# Patient Record
Sex: MALE | Race: WHITE | URBAN - METROPOLITAN AREA
[De-identification: names, ages, dates, MRNs, and addresses within clinical notes are randomized per-mention and may not be internally consistent; named-entity substitution may affect disease eponyms.]

---

## 2017-11-29 ENCOUNTER — OFFICE VISIT (OUTPATIENT)
Dept: FAMILY MEDICINE | Facility: CLINIC | Age: 47
End: 2017-11-29
Payer: COMMERCIAL

## 2017-11-29 VITALS
RESPIRATION RATE: 12 BRPM | BODY MASS INDEX: 19.88 KG/M2 | DIASTOLIC BLOOD PRESSURE: 79 MMHG | HEIGHT: 71 IN | HEART RATE: 62 BPM | SYSTOLIC BLOOD PRESSURE: 123 MMHG | OXYGEN SATURATION: 97 % | WEIGHT: 142 LBS | TEMPERATURE: 99.6 F

## 2017-11-29 DIAGNOSIS — Z71.84 TRAVEL ADVICE ENCOUNTER: Primary | ICD-10-CM

## 2017-11-29 PROCEDURE — 99401 PREV MED CNSL INDIV APPRX 15: CPT | Mod: 25 | Performed by: FAMILY MEDICINE

## 2017-11-29 PROCEDURE — 90471 IMMUNIZATION ADMIN: CPT | Performed by: FAMILY MEDICINE

## 2017-11-29 PROCEDURE — 90691 TYPHOID VACCINE IM: CPT | Performed by: FAMILY MEDICINE

## 2017-11-29 RX ORDER — ATOVAQUONE AND PROGUANIL HYDROCHLORIDE 250; 100 MG/1; MG/1
1 TABLET, FILM COATED ORAL DAILY
Qty: 23 TABLET | Refills: 0 | Status: SHIPPED | OUTPATIENT
Start: 2017-11-29

## 2017-11-29 RX ORDER — AZITHROMYCIN 500 MG/1
1000 TABLET, FILM COATED ORAL ONCE
Qty: 2 TABLET | Refills: 0 | Status: SHIPPED | OUTPATIENT
Start: 2017-11-29 | End: 2017-11-29

## 2017-11-29 RX ORDER — ACETAZOLAMIDE 125 MG/1
TABLET ORAL
Qty: 3 TABLET | Refills: 0 | Status: SHIPPED | OUTPATIENT
Start: 2017-11-29

## 2017-11-29 NOTE — PROGRESS NOTES
SUBJECTIVE: Hugh Birmingham , a 47 year old  male, presents for counseling and information regarding upcoming travel to [place]. Special medical concerns include: None. He anticipates the following unusual exposures: Hiking in rainforest.    Itinerary:  [place]    Departure Date: 12/24/17 Return date: 1/6/18    Reason for travel (i.e. Business, pleasure): pleasure    Visiting an urban or rural area?: both    Accommodations (i.e. hotel, hostel, friends, family, etc): hotel    Women - First day of your last period: n/a    IMMUNIZATION HISTORY  Have you received any vaccinations in the past 4 weeks?  No  Have you ever fainted from having your blood drawn or from an injection?  Yes  Have you ever had a fever reaction to vaccination?  No  Have you ever had any bad reaction or side effect from any vaccination?  No  Have you ever had hepatitis A or B vaccine?  Yes  Do you live (or work closely) with anyone who has AIDS, an AIDS-like condition, any other immune disorder or who is on chemotherapy for cancer?  No  Have you received any injection of immune globulin or any blood products during the past 12 months?  No    GENERAL MEDICAL HISTORY  Do you have a medical condition that warrants maintenance medication or physician follow-up?  No  Do you have a medical condition that is stable now, but that may recur while traveling?  No  Has your spleen been removed?  No  Have you had an acute illness or a fever in the past 48 hours?  No  Are you pregnant, or might you become pregnant on this trip?  Any chance of pregnancy?  No  Are you breastfeeding?  No  Do you have HIV, AIDS, an AIDS-like condition, any other immune disorder, leukemia or cancer?  No  Do you have a severe combined immunodeficiency disease?  No  Have you had your thymus gland removed or history of problems with your thymus, such as myasthenia gravis, DiGeorge syndrome, or thymoma?  No    Do you have severe thrombocytopenia (low platelet count) or a coagulation  disorder?  No  Have you ever had a convulsion, seizure, epilepsy, neurologic condition or brain infection?  No  Do you have any stomach conditions?  No  Do you have a G6PD deficiency?  No  Do you have severe renal or kidney impairment?  No  Do you have a history of psychiatric problems?  No  Do you have a problem with strange dreams and/or nightmares?  No  Do you have insomnia?  No  Do you have problems with vaginitis?  No  Do you have psoriasis?  No  Are you prone to motion sickness?  No  Have you ever had headaches, nausea, vomiting, or breathing problems from altitude exposure?  Yes      No past medical history on file.     There is no immunization history on file for this patient.    No current outpatient prescriptions on file.     Allergies not on file     EXAM: deferred    Immunizations discussed include: Hepatitis A, Hepatitis B, Typhoid, Rabies, Yellow Fever, Tetanus/Diphtheria and Measles/Mumps/Rubella  Malaraia prophylaxis recommended: Malarone  Symptomatic treatment for traveler's diarrhea: Azithromycin     ASSESSMENT/PLAN:  1. Travel advice encounter  - Yellow Fever vaccine UTD  - Hep A/B, MMR, polio, flu UTD  - atovaquone-proguanil (MALARONE) 250-100 MG per tablet; Take 1 tablet by mouth daily Start 2 days before travel and continue 7 days after return.  Dispense: 23 tablet; Refill: 0  - azithromycin (ZITHROMAX) 500 MG tablet; Take 2 tablets (1,000 mg) by mouth once for 1 dose As needed for traveler's diarrhea  Dispense: 2 tablet; Refill: 0  - acetaZOLAMIDE (DIAMOX) 125 MG tablet; To prevent altitude sickness.  Take one tablet every 12 hours, starting one day prior to ascent.  Continue for 24-48 hours while at the higher elevation.  Dispense: 3 tablet; Refill: 0  - TYPHOID VACCINE, IM    I have reviewed general recommendations for safe travel   including: food/water precautions, insect avoidance, safe sex   practices given high prevalence of HIV and other STDs,   roadway safety. Educational materials  and links to the [facility]   Traveler's health website have been provided.    Total time 15 minutes, greater than 50 percent in counseling   and coordination of care.

## 2017-11-29 NOTE — MR AVS SNAPSHOT
"              After Visit Summary   2017    Hugh Birmingham    MRN: [id number]           Patient Information     Date Of Birth          1970        Visit Information        Provider Department      2017 7:20 AM Darline Espinosa, DO [facility]        Today's Diagnoses     Travel advice encounter    -  1       Follow-ups after your visit        Who to contact     If you have questions or need follow up information about today's clinic visit or your schedule please contact [facility] directly at [phone].  Normal or non-critical lab and imaging results will be communicated to you by TouchMailhart, letter or phone within 4 business days after the clinic has received the results. If you do not hear from us within 7 days, please contact the clinic through TouchMailhart or phone. If you have a critical or abnormal lab result, we will notify you by phone as soon as possible.  Submit refill requests through etrigg or call your pharmacy and they will forward the refill request to us. Please allow 3 business days for your refill to be completed.          Additional Information About Your Visit        MyChart Information     etrigg lets you send messages to your doctor, view your test results, renew your prescriptions, schedule appointments and more. To sign up, go to [email or web address] . Click on \"Log in\" on the left side of the screen, which will take you to the Welcome page. Then click on \"Sign up Now\" on the right side of the page.     You will be asked to enter the access code listed below, as well as some personal information. Please follow the directions to create your username and password.     Your access code is: [email or web address]  Expires: 2018  8:54 AM     Your access code will  in 90 days. If you need help or a new code, please call your [facility] or [phone].        Care EveryWhere ID     This is your Care EveryWhere ID. This could be " "used by other organizations to access your [facility] medical records  [id number]        Your Vitals Were     Pulse Temperature Respirations Height Pulse Oximetry BMI (Body Mass Index)    62 99.6  F (37.6  C) (Oral) 12 5' 11\" (1.803 m) 97% 19.8 kg/m2       Blood Pressure from Last 3 Encounters:   11/29/17 123/79    Weight from Last 3 Encounters:   11/29/17 142 lb (64.4 kg)              We Performed the Following     TYPHOID VACCINE, IM          Today's Medication Changes          These changes are accurate as of: 11/29/17  8:54 AM.  If you have any questions, ask your nurse or doctor.               Start taking these medicines.        Dose/Directions    acetaZOLAMIDE 125 MG tablet   Commonly known as:  DIAMOX   Used for:  Travel advice encounter   Started by:  Darline Espinosa DO        To prevent altitude sickness.  Take one tablet every 12 hours, starting one day prior to ascent.  Continue for 24-48 hours while at the higher elevation.   Quantity:  3 tablet   Refills:  0       atovaquone-proguanil 250-100 MG per tablet   Commonly known as:  MALARONE   Used for:  Travel advice encounter   Started by:  Darline Espinosa DO        Dose:  1 tablet   Take 1 tablet by mouth daily Start 2 days before travel and continue 7 days after return.   Quantity:  23 tablet   Refills:  0       azithromycin 500 MG tablet   Commonly known as:  ZITHROMAX   Used for:  Travel advice encounter   Started by:  Darline Espinosa DO        Dose:  1000 mg   Take 2 tablets (1,000 mg) by mouth once for 1 dose As needed for traveler's diarrhea   Quantity:  2 tablet   Refills:  0            Where to get your medicines      These medications were sent to [facility] [address] AT [address] [address]     Phone:  [phone]     atovaquone-proguanil 250-100 MG per tablet    azithromycin 500 MG tablet         Some of these will need a paper prescription and others " can be bought over the counter.  Ask your nurse if you have questions.     Bring a paper prescription for each of these medications     acetaZOLAMIDE 125 MG tablet                Primary Care Provider Fax #    Physician No Ref-Primary [phone]       No address on file        Equal Access to Services     FLORINA MCDANIELS : Hadmarilynn obed wells golden Soanicetoali, waaxda luqadaha, qaybta kaalmada adestiven, clarice tanner laCyndivijay gastelum. So [facility] [phone].    ATENCIÓN: Si habla español, tiene a montelongo disposición servicios gratuitos de asistencia lingüística. Llame al [phone].    We comply with applicable federal civil rights laws and [place] laws. We do not discriminate on the basis of race, color, national origin, age, disability, sex, sexual orientation, or gender identity.            Thank you!     Thank you for choosing [facility]  for your care. Our goal is always to provide you with excellent care. Hearing back from our patients is one way we can continue to improve our services. Please take a few minutes to complete the written survey that you may receive in the mail after your visit with us. Thank you!             Your Updated Medication List - Protect others around you: Learn how to safely use, store and throw away your medicines at www.disposemymeds.org.          This list is accurate as of: 11/29/17  8:54 AM.  Always use your most recent med list.                   Brand Name Dispense Instructions for use Diagnosis    acetaZOLAMIDE 125 MG tablet    DIAMOX    3 tablet    To prevent altitude sickness.  Take one tablet every 12 hours, starting one day prior to ascent.  Continue for 24-48 hours while at the higher elevation.    Travel advice encounter       atovaquone-proguanil 250-100 MG per tablet    MALARONE    23 tablet    Take 1 tablet by mouth daily Start 2 days before travel and continue 7 days after return.    Travel advice encounter       azithromycin 500 MG tablet     ZITHROMAX    2 tablet    Take 2 tablets (1,000 mg) by mouth once for 1 dose As needed for traveler's diarrhea    Travel advice encounter